# Patient Record
(demographics unavailable — no encounter records)

---

## 2024-11-26 NOTE — PHYSICAL EXAM
[No Acute Distress] : no acute distress [Well Nourished] : well nourished [Normal Oropharynx] : the oropharynx was normal [No Lymphadenopathy] : no lymphadenopathy [Clear to Auscultation] : lungs were clear to auscultation bilaterally [Normal S1, S2] : normal S1 and S2 [No Edema] : there was no peripheral edema [Non Tender] : non-tender [de-identified] : mostly nonverbal, sitting quietly in WC; clean and dressed nicely [de-identified] : severe dementia, nonverbal

## 2024-11-26 NOTE — ASSESSMENT
[FreeTextEntry1] : Pt as outlined, progressing dementia but stable on her regimen. Vaccines uptodate except Tdap, will get at pharm per Merry. Moving to [facility] home soon, will be diff transition.  contin current regimen, meds renewed.

## 2024-11-26 NOTE — HISTORY OF PRESENT ILLNESS
[FreeTextEntry1] : f/u [de-identified] :  73 yo woman w  progressive dementia, pulm embolism (incid finding, asx, unprovoked), anxiety/depr, incontinence, UTIs. Here w aide Merry. h/o  fall, hip fx s/p repair then rehab at [facility] in '23; now wheelchair bound. Note: pt is not on OPorosis medication. Famiy did not want to pursue due to comorbidities and dental issues. Meds reconciled, on Cryptohep for appetite, does cause some constip but relieved w senna leaf; no diarrhea. Oxybutinin 5 mg, they feel sl helpful but worry about side effects if we go up on dose, Neuro had wanted any meds avoided if possible. Lexapro, Xarelto, Nemantine. Mood and sleeping are ok per aide. Appetie is better on Cryptoheptdine. Saw Neuro, no changes. Speaking less these days. Flu shot: had Being moved to [facility] home in 1/25, Merry will try to stay during transition.

## 2024-12-10 NOTE — REVIEW OF SYSTEMS
[Confused or Disoriented] : confusion [Memory Lapses or Loss] : memory loss [Decr. Concentrating Ability] : decreased concentrating ability [Difficulty with Language] : ~M difficulty with language [Changed Thought Patterns] : no change in thought patterns [Repeating Questions] : repeated questioning about recent events [Facial Weakness] : no facial weakness [Arm Weakness] : no arm weakness [Hand Weakness] :  hand weakness [Leg Weakness] : leg weakness [Poor Coordination] : poor coordination [Difficulty Writing] : difficulty writing [Difficulties in Speech] : speech difficulties [Numbness] : no numbness [Tingling] : no tingling [Abnormal Sensation] : no abnormal sensation [Hypersensitivity] : no hypersensitivity [Seizures] : no convulsions [Dizziness] : no dizziness [Fainting] : no fainting [Lightheadedness] : no lightheadedness [Vertigo] : no vertigo [Cluster Headache] : no cluster headache [Migraine Headache] : no migraine headache [Tension Headache] : no tension-type headache [Difficulty Walking] : difficulty walking [Inability to Walk] : able to walk [Ataxia] : no ataxia [Frequent Falls] : not falling [Limping] : not limping [As Noted in HPI] : as noted in HPI [Negative] : Heme/Lymph [de-identified] : anxiety and mild depression

## 2024-12-10 NOTE — ASSESSMENT
[FreeTextEntry1] : Assessment:  75yo RH WW with progressive cognitive decline and parkinsonism. Dependent on all ADLs/IADLs.  Sometimes forgets to chew her food.  Minimal speech output, only follows simple commands.  Diffuse paratonia and parkinsonian type features.  Needs bilat support to stand and walk, minimally.  Diagnostic Impression: Advanced dementia -LOAD -parkinsonism  Plan: -try and reduced/DC cyproheptadine, may be a bit sedating -no other changes -trying to get home care paid by [facility] - will keep me posted if I can help.

## 2024-12-10 NOTE — PHYSICAL EXAM
[FreeTextEntry1] : [id number]: Awake, partially aware. Follows simple commands, but limited speech. Diffuse rigidity, no tremors. Can stand with double support and take a few steps but needs to sustained.  [General Appearance - Alert] : alert [General Appearance - In No Acute Distress] : in no acute distress [Affect] : the affect was normal [Person] : oriented to person [Place] : disoriented to place [Time] : disoriented to time [Short Term Intact] : short term memory impaired [Remote Intact] : remote memory impaired [Registration Intact] : recent registration memory impaired [Span Intact] : the attention span was decreased [Concentration Intact] : normal concentrating ability [Visual Intact] : visual attention was ~T ~L decreased [Naming Objects] : difficulty naming common objects [Repeating Phrases] : difficulty repeating a phrase [Writing A Sentence] : difficulty writing a sentence [Fluency] : fluency not intact [Comprehension] : comprehension not intact [Reading] : difficulty reading [Current Events] : inadequate knowledge of current events [Past History] : inadequate knowledge of personal past history [Vocabulary] : inadequate range of vocabulary [Total Score ___ / 30] : the patient achieved a score of [unfilled] /30 [Date / Time ___ / 5] : date / time [unfilled] / 5 [Place ___ / 5] : place [unfilled] / 5 [Registration ___ / 3] : registration [unfilled] / 3 [Serial Sevens ___/5] : serial sevens [unfilled] / 5 [Naming 2 Objects ___ / 2] : naming two objects [unfilled] / 2 [Repeating a Sentence ___ / 1] : repeating a sentence [unfilled] / 1 [Writing a Sentence ___ / 1] : write sentence [unfilled] / 1 [3-stage Verbal Command ___ / 3] : three-stage verbal command [unfilled] / 3 [Written Command ___ / 1] : written command [unfilled] / 1 [Copy a Design ___ / 1] : copy a design [unfilled] / 1 [Recall ___ / 3] : recall [unfilled] / 3 [Cranial Nerves Optic (II)] : visual acuity intact bilaterally,  visual fields full to confrontation, pupils equal round and reactive to light [Cranial Nerves Trigeminal (V)] : facial sensation intact symmetrically [Cranial Nerves Facial (VII)] : face symmetrical [Cranial Nerves Vestibulocochlear (VIII)] : hearing was intact bilaterally [Cranial Nerves Glossopharyngeal (IX)] : tongue and palate midline [Cranial Nerves Accessory (XI - Cranial And Spinal)] : head turning and shoulder shrug symmetric [Cranial Nerves Hypoglossal (XII)] : there was no tongue deviation with protrusion [Motor Handedness Right-Handed] : the patient is right hand dominant [Paresis Pronator Drift Right-Sided] : no pronator drift on the right [Paresis Pronator Drift Left-Sided] : no pronator drift on the left [Motor Strength Upper Extremities Bilaterally] : there was weakness in both upper extremities [Motor Strength Upper Extremities Right] : there was weakness of the right upper extremity [Motor Strength Upper Extremities Left] : there was weakness of the left upper extremity [Motor Strength Lower Extremities Bilaterally] : there was weakness in both lower extremities [Motor Strength Lower Extremities Right] : there was weakness of the right lower extremity [Motor Strength Lower Extremities Left] : there was weakness of the left lower extremity [Limited Balance] : the patient's balance was impaired [Past-pointing] : past-pointing was present [Tremor] : no tremor present [3+] : Ankle jerk left 3+ [Plantar Reflex Right Only] : normal on the right [Plantar Reflex Left Only] : normal on the left [Glabellar Reflex] : the glabellar reflex was present [FreeTextEntry4] : exam limited -speaks softly and quietly  [FreeTextEntry5] : eyes will track but will not follow on demand  [FreeTextEntry6] : diffuse paratonia in UE R > L and LE [FreeTextEntry8] : unable to examine her walking or standing. w/c bound  [FreeTextEntry9] : previous exam: Small step shuffling gait, needs full assistance with walking; can march in place and pick her feet up minimally [Sclera] : the sclera and conjunctiva were normal [PERRL With Normal Accommodation] : pupils were equal in size, round, reactive to light, with normal accommodation [Extraocular Movements] : extraocular movements were intact [No APD] : no afferent pupillary defect [No ANJEL] : no internuclear ophthalmoplegia [Outer Ear] : the ears and nose were normal in appearance [Oropharynx] : the oropharynx was normal [Neck Appearance] : the appearance of the neck was normal [Neck Cervical Mass (___cm)] : no neck mass was observed [Jugular Venous Distention Increased] : there was no jugular-venous distention [Thyroid Diffuse Enlargement] : the thyroid was not enlarged [Thyroid Nodule] : there were no palpable thyroid nodules [] : no respiratory distress [Auscultation Breath Sounds / Voice Sounds] : lungs were clear to auscultation bilaterally [Heart Sounds Gallop] : no gallops [Murmurs] : no murmurs [Heart Sounds Pericardial Friction Rub] : no pericardial rub [Full Pulse] : the pedal pulses are present [Edema] : there was no peripheral edema

## 2024-12-10 NOTE — REASON FOR VISIT
[Follow-Up: _____] : a [unfilled] follow-up visit [Formal Caregiver] : formal caregiver [FreeTextEntry1] : memory

## 2024-12-10 NOTE — HISTORY OF PRESENT ILLNESS
[FreeTextEntry1] : Covid 3, mild. COVID VACCINE FULL.   2024: -On Cyproheptadine 4mg daily - for appetite (?), runny nose? unclear -appetite reduced - no real loss of weight -sleeps ok -     HPI interval 2024: 73-year-old female presents today for follow up with her sister-in-law. Has an aid . She lives with her son and brother. She is w/c bound and depends on it fully. Can  shower with assistance. Remains dependent on all adls and iadls. Her memory has decline more. No hallucinations or delusions. She is not undergoing treatment for Colon cancer. She is more confused and disoriented.  appetite: good. she gained some weight. she eats 3 meals  sleep: good mood: ok speech: poor. She speaks loud and poor comprehension. Difficulty making a sentence.   Interval Hx: 2023 Since last seen. Cognitive has been relatively stable. No issues with mood or appetite. Hallucinations are minimal. Speech is a bit worse. Will not finish a sentence at times, will pause and not make sense. Sometimes she will forget to chew her food. No coughing or signs of aspiration. She is no longer getting PT. Can stand minimally. Continues to be mostly wheelchair bound. Has a aid .  Remains dependent on all ADLs and IADLs.   Interval Hx: 2023  Since last seen there has been a cognitive decline.  She is here with her aid Ange. She is more confused and continues to be dependent with all ADLS. She is now also having hallucinations of people who are not present. She will have episodes of agitation trying to leave the house but can be redirected.  Sleep and appetite no issues. She is receiving PT 3x/week. Wheelchair bound mostly. She is happy most of the time.  She does have a recent diagnosis of colon cancer.    Interval  Hx : 2023:: Since last visit, she is not functioning well.  When taking Sinemet she had adverse effects which was then d/c.  She now needs dependence on all her ADLs, has a caregiver . She uses a wheelchair mostly now because of motor changes  She is sleeping at night but wakes up infrequently. No agitation. Her appetite varies but no changes in weight. She enjoys going out and socializing. She is aware when she needs to use the bathroom but is incontinent at night.   HPI-Interval Hx 2022: Pt here with her HHA. Recent dental issues, bleeding from gums, unclear what was done-she reports an MRI, but the 3/8 MRI was done for me. Per HHA, she uses toothpicks too much, causing bleeds.  Appetite and sleep ok per pt, but HHA says she is quite agitated at night, restless, no napping in the day. Weight is stable. Night time issues are also driven by anxiety, e.g. when she has an appt the next day. Motor: one fall getting off the bed, related by pt, no one saw it. She appears a bit slower. reports more difficulty getting up from chair. Pt more sedentary. MRI reviewed, AD pattern. Labs with possible allergic pattern, no ABs +. Pt confirms having severe seasonal and dust allergy.  HPI: 72yo RH WW with Anxiety, here with possible dementia.   PMH: Pt seen by Dom aSmuels in the past. Since 1-2y, she has started to have STM issues. She was seen by Dr. Samuels, and started with Namenda. Per 2021 note, pt was having memory issues, seen by Paras Gilbert, with NPT c/w dementia, but atypical.  LOC in , NOS.   Since taking Oxybutynin, and maybe Namenda, she has less appetite and possibly her gait is slower.   -Memory: recent events, appointments, some names and faces -Speech: ok -Orientation: ok for now; stopped driving -Praxis: some issues with phone, buttons etc. -Decision making/Executive fx/Multitasking: limited  -Sleep: ok, seldom naps  -Appetite: stable, eats more vegetables now  -Motor symptoms: slower gait, more unstable  -B/B: urgency, polyuria  -Psychiatric symptoms: anxiety, possible VH (woman in the house).  -Functional status: Bo Index of [place] in Activities of Daily Livin. Bathing/Showerin-needs some help 2. Dressin 3. Toiletin 4. Transferrin-needs help in the shower, needed to have rails put in 5. Continence: 1 6: Feedin  TOTAL: 6  [place] Instrumental Activities of Daily Living: A. Ability to Use Telephone: 1 B. Shoppin C. Food Preparation: 0 D. Housekeepin E. Laundry: 0 F. Transportation: 0 G. Responsibility for Own Medications: 1 H: Ability to Handle Finances: 0  TOTAL: 3  CDR: 1.0  -Professional status: RN  PCP and other physicians: -PCP: BRYSON ESPARZA -Neuro: Arvind EASTMAN MD [place], [place]  [phone]  Workup done: MRI 2019 seen on [facility] viviana: TP atrophy, maybe occipital; on NQ analysis, O involved, with asymmetry L<R.

## 2024-12-10 NOTE — DATA REVIEWED
[de-identified] : I reviewed the MRI brain from 2019 on [facility] website. \par  New MRI brain 2022: confirm atrophy in AD pattern.

## 2025-01-14 NOTE — ASSESSMENT
[FreeTextEntry1] : Pt w advanced dementia. Appetite decreased after stopping the Cyproheptadine which had been helping. P; resume the medication, let me know if too lethargic. f/u 2 mo

## 2025-01-14 NOTE — HISTORY OF PRESENT ILLNESS
[FreeTextEntry8] : Merry brought pt in as she hasn't been eating past mo. Only cookies and chocolate. Saw Neuro 12/24 and Cyproheptadine was d/cd, per aide, this was helping her appetite very much. She seems otherwise stable per aide.  Was not sent to [facility] [place] home so far, still in own home.

## 2025-01-14 NOTE — PHYSICAL EXAM
[No Acute Distress] : no acute distress [Normal Oropharynx] : the oropharynx was normal [No Lymphadenopathy] : no lymphadenopathy [Clear to Auscultation] : lungs were clear to auscultation bilaterally [Normal S1, S2] : normal S1 and S2 [No Edema] : there was no peripheral edema [de-identified] : advanced dementia, nonverbal [de-identified] : mute

## 2025-02-05 NOTE — DATA REVIEWED
[de-identified] : I reviewed the MRI brain from 2019 on [facility] website. \par  New MRI brain 2022: confirm atrophy in AD pattern.

## 2025-02-05 NOTE — HISTORY OF PRESENT ILLNESS
[FreeTextEntry1] : Covid 3, mild. COVID VACCINE FULL.   HPI interval 2025: 74-year-old female presents today with her hha. HHA is 24/7. No hallucinations or delusions. No recent falls or hospitalizations. She socializes a lot with her friends. She walks in house a little. Memory has declined. No changes in adls or iadls. She is fully dependent in adls and iadls. No changes in medications.  mood: normal appetite: improved- weight is stable  sleep: good speech: declined in comprehnesion   2024: -On Cyproheptadine 4mg daily - for appetite (?), runny nose? unclear -appetite reduced - no real loss of weight -sleeps ok  HPI interval 2024: 73-year-old female presents today for follow up with her sister-in-law. Has an aid . She lives with her son and brother. She is w/c bound and depends on it fully. Can  shower with assistance. Remains dependent on all adls and iadls. Her memory has decline more. No hallucinations or delusions. She is not undergoing treatment for Colon cancer. She is more confused and disoriented.  appetite: good. she gained some weight. she eats 3 meals  sleep: good mood: ok speech: poor. She speaks loud and poor comprehension. Difficulty making a sentence.   Interval Hx: 2023 Since last seen. Cognitive has been relatively stable. No issues with mood or appetite. Hallucinations are minimal. Speech is a bit worse. Will not finish a sentence at times, will pause and not make sense. Sometimes she will forget to chew her food. No coughing or signs of aspiration. She is no longer getting PT. Can stand minimally. Continues to be mostly wheelchair bound. Has a aid .  Remains dependent on all ADLs and IADLs.   Interval Hx: 2023  Since last seen there has been a cognitive decline.  She is here with her aid Ange. She is more confused and continues to be dependent with all ADLS. She is now also having hallucinations of people who are not present. She will have episodes of agitation trying to leave the house but can be redirected.  Sleep and appetite no issues. She is receiving PT 3x/week. Wheelchair bound mostly. She is happy most of the time.  She does have a recent diagnosis of colon cancer.    Interval  Hx : 2023:: Since last visit, she is not functioning well.  When taking Sinemet she had adverse effects which was then d/c.  She now needs dependence on all her ADLs, has a caregiver . She uses a wheelchair mostly now because of motor changes  She is sleeping at night but wakes up infrequently. No agitation. Her appetite varies but no changes in weight. She enjoys going out and socializing. She is aware when she needs to use the bathroom but is incontinent at night.   HPI-Interval Hx 2022: Pt here with her HHA. Recent dental issues, bleeding from gums, unclear what was done-she reports an MRI, but the 3/8 MRI was done for me. Per HHA, she uses toothpicks too much, causing bleeds.  Appetite and sleep ok per pt, but HHA says she is quite agitated at night, restless, no napping in the day. Weight is stable. Night time issues are also driven by anxiety, e.g. when she has an appt the next day. Motor: one fall getting off the bed, related by pt, no one saw it. She appears a bit slower. reports more difficulty getting up from chair. Pt more sedentary. MRI reviewed, AD pattern. Labs with possible allergic pattern, no ABs +. Pt confirms having severe seasonal and dust allergy.  HPI: 70yo RH WW with Anxiety, here with possible dementia.   PMH: Pt seen by Dom Samuels in the past. Since 1-2y, she has started to have STM issues. She was seen by Dr. Samuels, and started with Namenda. Per 2021 note, pt was having memory issues, seen by Paras Gilbert, with NPT c/w dementia, but atypical.  LOC in , NOS.   Since taking Oxybutynin, and maybe Namenda, she has less appetite and possibly her gait is slower.   -Memory: recent events, appointments, some names and faces -Speech: ok -Orientation: ok for now; stopped driving -Praxis: some issues with phone, buttons etc. -Decision making/Executive fx/Multitasking: limited  -Sleep: ok, seldom naps  -Appetite: stable, eats more vegetables now  -Motor symptoms: slower gait, more unstable  -B/B: urgency, polyuria  -Psychiatric symptoms: anxiety, possible VH (woman in the house).  -Functional status: Bo Index of [place] in Activities of Daily Livin. Bathing/Showerin-needs some help 2. Dressin 3. Toiletin 4. Transferrin-needs help in the shower, needed to have rails put in 5. Continence: 1 6: Feedin  TOTAL: 6  [place] Instrumental Activities of Daily Living: A. Ability to Use Telephone: 1 B. Shoppin C. Food Preparation: 0 D. Housekeepin E. Laundry: 0 F. Transportation: 0 G. Responsibility for Own Medications: 1 H: Ability to Handle Finances: 0  TOTAL: 3  CDR: 1.0  -Professional status: RN  PCP and other physicians: -PCP: BRYSON ESPARZA -Neuro: Arvind EASTMAN MD [place], [place]  [phone]  Workup done: MRI 2019 seen on [facility] viviana: TP atrophy, maybe occipital; on NQ analysis, O involved, with asymmetry L<R.

## 2025-02-05 NOTE — REVIEW OF SYSTEMS
[Confused or Disoriented] : confusion [Memory Lapses or Loss] : memory loss [Decr. Concentrating Ability] : decreased concentrating ability [Difficulty with Language] : ~M difficulty with language [Repeating Questions] : repeated questioning about recent events [Hand Weakness] :  hand weakness [Leg Weakness] : leg weakness [Poor Coordination] : poor coordination [Difficulty Writing] : difficulty writing [Difficulties in Speech] : speech difficulties [Difficulty Walking] : difficulty walking [As Noted in HPI] : as noted in HPI [Negative] : Heme/Lymph [Changed Thought Patterns] : no change in thought patterns [Facial Weakness] : no facial weakness [Arm Weakness] : no arm weakness [Numbness] : no numbness [Tingling] : no tingling [Abnormal Sensation] : no abnormal sensation [Hypersensitivity] : no hypersensitivity [Seizures] : no convulsions [Dizziness] : no dizziness [Fainting] : no fainting [Lightheadedness] : no lightheadedness [Vertigo] : no vertigo [Cluster Headache] : no cluster headache [Migraine Headache] : no migraine headache [Tension Headache] : no tension-type headache [Inability to Walk] : able to walk [Ataxia] : no ataxia [Frequent Falls] : not falling [Limping] : not limping [de-identified] : anxiety and mild depression

## 2025-02-05 NOTE — REASON FOR VISIT
[Follow-Up: _____] : a [unfilled] follow-up visit [Formal Caregiver] : formal caregiver [FreeTextEntry1] : cognitive decline

## 2025-02-05 NOTE — PHYSICAL EXAM
[General Appearance - Alert] : alert [General Appearance - In No Acute Distress] : in no acute distress [Affect] : the affect was normal [Person] : oriented to person [Concentration Intact] : normal concentrating ability [Total Score ___ / 30] : the patient achieved a score of [unfilled] /30 [Date / Time ___ / 5] : date / time [unfilled] / 5 [Place ___ / 5] : place [unfilled] / 5 [Registration ___ / 3] : registration [unfilled] / 3 [Serial Sevens ___/5] : serial sevens [unfilled] / 5 [Naming 2 Objects ___ / 2] : naming two objects [unfilled] / 2 [Repeating a Sentence ___ / 1] : repeating a sentence [unfilled] / 1 [Writing a Sentence ___ / 1] : write sentence [unfilled] / 1 [3-stage Verbal Command ___ / 3] : three-stage verbal command [unfilled] / 3 [Written Command ___ / 1] : written command [unfilled] / 1 [Copy a Design ___ / 1] : copy a design [unfilled] / 1 [Recall ___ / 3] : recall [unfilled] / 3 [Cranial Nerves Optic (II)] : visual acuity intact bilaterally,  visual fields full to confrontation, pupils equal round and reactive to light [Cranial Nerves Trigeminal (V)] : facial sensation intact symmetrically [Cranial Nerves Facial (VII)] : face symmetrical [Cranial Nerves Vestibulocochlear (VIII)] : hearing was intact bilaterally [Cranial Nerves Glossopharyngeal (IX)] : tongue and palate midline [Cranial Nerves Accessory (XI - Cranial And Spinal)] : head turning and shoulder shrug symmetric [Cranial Nerves Hypoglossal (XII)] : there was no tongue deviation with protrusion [Motor Handedness Right-Handed] : the patient is right hand dominant [Motor Strength Upper Extremities Bilaterally] : there was weakness in both upper extremities [Motor Strength Upper Extremities Right] : there was weakness of the right upper extremity [Motor Strength Upper Extremities Left] : there was weakness of the left upper extremity [Motor Strength Lower Extremities Bilaterally] : there was weakness in both lower extremities [Motor Strength Lower Extremities Right] : there was weakness of the right lower extremity [Motor Strength Lower Extremities Left] : there was weakness of the left lower extremity [Limited Balance] : the patient's balance was impaired [Past-pointing] : past-pointing was present [3+] : Ankle jerk left 3+ [Glabellar Reflex] : the glabellar reflex was present [Sclera] : the sclera and conjunctiva were normal [PERRL With Normal Accommodation] : pupils were equal in size, round, reactive to light, with normal accommodation [Extraocular Movements] : extraocular movements were intact [No APD] : no afferent pupillary defect [No ANJEL] : no internuclear ophthalmoplegia [Outer Ear] : the ears and nose were normal in appearance [Oropharynx] : the oropharynx was normal [Neck Appearance] : the appearance of the neck was normal [Neck Cervical Mass (___cm)] : no neck mass was observed [Jugular Venous Distention Increased] : there was no jugular-venous distention [Thyroid Diffuse Enlargement] : the thyroid was not enlarged [Thyroid Nodule] : there were no palpable thyroid nodules [] : no respiratory distress [Auscultation Breath Sounds / Voice Sounds] : lungs were clear to auscultation bilaterally [Heart Sounds Gallop] : no gallops [Murmurs] : no murmurs [Heart Sounds Pericardial Friction Rub] : no pericardial rub [Full Pulse] : the pedal pulses are present [Edema] : there was no peripheral edema [FreeTextEntry1] : [id number]: Awake, partially aware. Follows simple commands, but limited speech. Diffuse rigidity, no tremors. Can stand with double support and take a few steps but needs to sustained.  [Place] : disoriented to place [Time] : disoriented to time [Short Term Intact] : short term memory impaired [Remote Intact] : remote memory impaired [Registration Intact] : recent registration memory impaired [Span Intact] : the attention span was decreased [Visual Intact] : visual attention was ~T ~L decreased [Naming Objects] : difficulty naming common objects [Repeating Phrases] : difficulty repeating a phrase [Writing A Sentence] : difficulty writing a sentence [Fluency] : fluency not intact [Comprehension] : comprehension not intact [Reading] : difficulty reading [Current Events] : inadequate knowledge of current events [Past History] : inadequate knowledge of personal past history [Vocabulary] : inadequate range of vocabulary [Paresis Pronator Drift Right-Sided] : no pronator drift on the right [Paresis Pronator Drift Left-Sided] : no pronator drift on the left [Tremor] : no tremor present [Plantar Reflex Right Only] : normal on the right [Plantar Reflex Left Only] : normal on the left [FreeTextEntry4] : exam limited -speaks softly and quietly  [FreeTextEntry5] : eyes will track but will not follow on demand  [FreeTextEntry6] : cogwheel rigidity  [FreeTextEntry8] : unable to examine her walking or standing. w/c bound  [FreeTextEntry9] : previous exam: Small step shuffling gait, needs full assistance with walking; can march in place and pick her feet up minimally

## 2025-02-05 NOTE — ASSESSMENT
[FreeTextEntry1] : Assessment:  75yo RH WW with progressive cognitive decline and parkinsonism. Dependent on all ADLs/IADLs.  Sometimes forgets to chew her food.  Minimal speech output only follows simple commands.  Diffuse paratonia and parkinsonian type features.  Needs bilat support to stand and walk, minimally.  Diagnostic Impression: Advanced dementia -LOAD -parkinsonism  Plan: -no other changes -PT for unsteady gait, physical deconditioning  -Appetite has improved, stable weight

## 2025-04-17 NOTE — PHYSICAL EXAM
[No Acute Distress] : no acute distress [Well Nourished] : well nourished [Well Developed] : well developed [Well-Appearing] : well-appearing [Normal Sclera/Conjunctiva] : normal sclera/conjunctiva [PERRL] : pupils equal round and reactive to light [EOMI] : extraocular movements intact [Normal Outer Ear/Nose] : the outer ears and nose were normal in appearance [Normal Oropharynx] : the oropharynx was normal [No JVD] : no jugular venous distention [No Lymphadenopathy] : no lymphadenopathy [Supple] : supple [Thyroid Normal, No Nodules] : the thyroid was normal and there were no nodules present [No Respiratory Distress] : no respiratory distress  [No Accessory Muscle Use] : no accessory muscle use [Clear to Auscultation] : lungs were clear to auscultation bilaterally [Normal Rate] : normal rate  [Regular Rhythm] : with a regular rhythm [Normal S1, S2] : normal S1 and S2 [No Murmur] : no murmur heard [No Carotid Bruits] : no carotid bruits [No Abdominal Bruit] : a ~M bruit was not heard ~T in the abdomen [No Varicosities] : no varicosities [Pedal Pulses Present] : the pedal pulses are present [No Edema] : there was no peripheral edema [No Palpable Aorta] : no palpable aorta [No Extremity Clubbing/Cyanosis] : no extremity clubbing/cyanosis [Soft] : abdomen soft [Non Tender] : non-tender [Non-distended] : non-distended [No Masses] : no abdominal mass palpated [No HSM] : no HSM [Normal Bowel Sounds] : normal bowel sounds [Normal Posterior Cervical Nodes] : no posterior cervical lymphadenopathy [Normal Anterior Cervical Nodes] : no anterior cervical lymphadenopathy [No CVA Tenderness] : no CVA  tenderness [No Spinal Tenderness] : no spinal tenderness [No Joint Swelling] : no joint swelling [Grossly Normal Strength/Tone] : grossly normal strength/tone [No Rash] : no rash [Coordination Grossly Intact] : coordination grossly intact [No Focal Deficits] : no focal deficits [Deep Tendon Reflexes (DTR)] : deep tendon reflexes were 2+ and symmetric [de-identified] : examined in WC;quiet w occas unintelligible mumbling, very soft; confused. [de-identified] : nonamb [de-identified] : aphasic, confused, sitting silently in WC

## 2025-04-17 NOTE — ASSESSMENT
[Vaccines Reviewed] : Immunizations reviewed today. Please see immunization details in the vaccine log within the immunization flowsheet.  [FreeTextEntry1] : Pt w advancing multifactorial dementia. Family has opted out of HCMs eg mammo, [place]. Will check routine labs  Wt checked w assistance unclear if accurate or if prior wt s were accurate- in any case she has forgotten how to eat mostly and question of Gtube may come up. She had made wishes clear of no gtube but need to d/w family in near future in case she stops eating completely.  Tdap at pharm  Meds renewed.

## 2025-04-17 NOTE — HEALTH RISK ASSESSMENT
[Poor] : ~his/her~ mood as  poor [No] : No [No falls in past year] : Patient reported no falls in the past year [0] : 2) Feeling down, depressed, or hopeless: Not at all (0) [Never] : Never [Patient declined mammogram] : Patient declined mammogram [Patient declined PAP Smear] : Patient declined PAP Smear [Patient declined bone density test] : Patient declined bone density test [Patient declined colonoscopy] : Patient declined colonoscopy [HIV test declined] : HIV test declined [Hepatitis C test declined] : Hepatitis C test declined [Change in mental status noted] : Change in mental status noted [Language] : difficulty with language [Behavior] : difficulty with behavior [Handling Complex Tasks] : difficulty handling complex tasks [Reasoning] : difficulty with reasoning [de-identified] : no- WC bound [de-identified] : hardly eats [[id number]] : 0 [de-identified] : worsening dementia, aphasia, Parkinsons, LBD

## 2025-04-17 NOTE — HISTORY OF PRESENT ILLNESS
[de-identified] : 73 yo woman w lewy body dementia, Parkinsons' ds, Alzheimers h/o  pulm embolism (incid finding, asx, unprovoked), anxiety/depr, incontinence, UTIs. Here w aidjerome Sousa. She is wheelchair-bd, cannot stand for more than a few secs w full assistance. Dementia is advancing, unable to speak, no choking but hardly eating despite good options.  No UTIs recently. Diaper incont urine. BMs nl. once a week large volume. Flu shot: declines other vax : had old shingles vax many yrs ago, declines new shingrix; Tdap due will get at pharm.  Per prior discussions w Brody (brother, guardian) not doing HCMs such as Wayne, Mammo  Pt lives w son Mitchell; has 2 other sons  nearby and 4 grandkids. Ritesh and Domingo handle her insurance (changing to medicaid) and bills. They have arranged full time help thru medicaid which is apparently coming soon.  Pt doesn't eat much- per Merry, she forgot how to eat. She does go out to lunch once a mo w her lifelong friends and will eat at a restaurant in social setting. Merry cooks for her, she also drinks Ensure. She has told her family before becoming ill that she would never want artificial feeding eg G tube, will need to d/w kids and Brody.

## 2025-07-17 NOTE — PHYSICAL EXAM
[General Appearance - Alert] : alert [General Appearance - In No Acute Distress] : in no acute distress [Affect] : the affect was normal [Person] : oriented to person [Concentration Intact] : normal concentrating ability [Total Score ___ / 30] : the patient achieved a score of [unfilled] /30 [Date / Time ___ / 5] : date / time [unfilled] / 5 [Place ___ / 5] : place [unfilled] / 5 [Registration ___ / 3] : registration [unfilled] / 3 [Serial Sevens ___/5] : serial sevens [unfilled] / 5 [Naming 2 Objects ___ / 2] : naming two objects [unfilled] / 2 [Repeating a Sentence ___ / 1] : repeating a sentence [unfilled] / 1 [Writing a Sentence ___ / 1] : write sentence [unfilled] / 1 [3-stage Verbal Command ___ / 3] : three-stage verbal command [unfilled] / 3 [Written Command ___ / 1] : written command [unfilled] / 1 [Copy a Design ___ / 1] : copy a design [unfilled] / 1 [Recall ___ / 3] : recall [unfilled] / 3 [Cranial Nerves Optic (II)] : visual acuity intact bilaterally,  visual fields full to confrontation, pupils equal round and reactive to light [Cranial Nerves Trigeminal (V)] : facial sensation intact symmetrically [Cranial Nerves Facial (VII)] : face symmetrical [Cranial Nerves Vestibulocochlear (VIII)] : hearing was intact bilaterally [Cranial Nerves Glossopharyngeal (IX)] : tongue and palate midline [Cranial Nerves Accessory (XI - Cranial And Spinal)] : head turning and shoulder shrug symmetric [Cranial Nerves Hypoglossal (XII)] : there was no tongue deviation with protrusion [Motor Handedness Right-Handed] : the patient is right hand dominant [Motor Strength Upper Extremities Bilaterally] : there was weakness in both upper extremities [Motor Strength Upper Extremities Right] : there was weakness of the right upper extremity [Motor Strength Upper Extremities Left] : there was weakness of the left upper extremity [Motor Strength Lower Extremities Bilaterally] : there was weakness in both lower extremities [Motor Strength Lower Extremities Right] : there was weakness of the right lower extremity [Motor Strength Lower Extremities Left] : there was weakness of the left lower extremity [Limited Balance] : the patient's balance was impaired [Past-pointing] : past-pointing was present [3+] : Ankle jerk left 3+ [Glabellar Reflex] : the glabellar reflex was present [Sclera] : the sclera and conjunctiva were normal [PERRL With Normal Accommodation] : pupils were equal in size, round, reactive to light, with normal accommodation [Extraocular Movements] : extraocular movements were intact [No APD] : no afferent pupillary defect [No ANJEL] : no internuclear ophthalmoplegia [Outer Ear] : the ears and nose were normal in appearance [Oropharynx] : the oropharynx was normal [Neck Appearance] : the appearance of the neck was normal [Neck Cervical Mass (___cm)] : no neck mass was observed [Jugular Venous Distention Increased] : there was no jugular-venous distention [Thyroid Diffuse Enlargement] : the thyroid was not enlarged [Thyroid Nodule] : there were no palpable thyroid nodules [] : no respiratory distress [Auscultation Breath Sounds / Voice Sounds] : lungs were clear to auscultation bilaterally [Heart Sounds Gallop] : no gallops [Murmurs] : no murmurs [Heart Sounds Pericardial Friction Rub] : no pericardial rub [Full Pulse] : the pedal pulses are present [Edema] : there was no peripheral edema [FreeTextEntry1] : [id number]: Awake, partially aware. Follows simple commands, but limited speech. Diffuse rigidity, no tremors. Can stand with double support and take a few steps but needs to sustained.  [Place] : disoriented to place [Time] : disoriented to time [Short Term Intact] : short term memory impaired [Remote Intact] : remote memory impaired [Registration Intact] : recent registration memory impaired [Span Intact] : the attention span was decreased [Visual Intact] : visual attention was ~T ~L decreased [Naming Objects] : difficulty naming common objects [Repeating Phrases] : difficulty repeating a phrase [Writing A Sentence] : difficulty writing a sentence [Fluency] : fluency not intact [Comprehension] : comprehension not intact [Reading] : difficulty reading [Current Events] : inadequate knowledge of current events [Past History] : inadequate knowledge of personal past history [Vocabulary] : inadequate range of vocabulary [Paresis Pronator Drift Right-Sided] : no pronator drift on the right [Paresis Pronator Drift Left-Sided] : no pronator drift on the left [Tremor] : no tremor present [Plantar Reflex Right Only] : normal on the right [Plantar Reflex Left Only] : normal on the left [FreeTextEntry4] : exam limited -speaks softly and quietly  [FreeTextEntry5] : eyes will track but will not follow on demand  [FreeTextEntry6] : cogwheel rigidity  [FreeTextEntry8] : unable to examine her walking or standing. w/c bound  [FreeTextEntry9] : previous exam: Small step shuffling gait, needs full assistance with walking; can march in place and pick her feet up minimally

## 2025-07-17 NOTE — REVIEW OF SYSTEMS
[Confused or Disoriented] : confusion [Memory Lapses or Loss] : memory loss [Decr. Concentrating Ability] : decreased concentrating ability [Difficulty with Language] : ~M difficulty with language [Repeating Questions] : repeated questioning about recent events [Hand Weakness] :  hand weakness [Leg Weakness] : leg weakness [Poor Coordination] : poor coordination [Difficulty Writing] : difficulty writing [Difficulties in Speech] : speech difficulties [Difficulty Walking] : difficulty walking [As Noted in HPI] : as noted in HPI [Negative] : Heme/Lymph [Changed Thought Patterns] : no change in thought patterns [Facial Weakness] : no facial weakness [Arm Weakness] : no arm weakness [Numbness] : no numbness [Tingling] : no tingling [Abnormal Sensation] : no abnormal sensation [Hypersensitivity] : no hypersensitivity [Seizures] : no convulsions [Dizziness] : no dizziness [Fainting] : no fainting [Lightheadedness] : no lightheadedness [Vertigo] : no vertigo [Cluster Headache] : no cluster headache [Migraine Headache] : no migraine headache [Tension Headache] : no tension-type headache [Inability to Walk] : able to walk [Ataxia] : no ataxia [Frequent Falls] : not falling [Limping] : not limping [de-identified] : anxiety and mild depression

## 2025-07-17 NOTE — REASON FOR VISIT
Home [Follow-Up: _____] : a [unfilled] follow-up visit [Formal Caregiver] : formal caregiver [FreeTextEntry1] : cognitive decline

## 2025-07-17 NOTE — ASSESSMENT
[FreeTextEntry1] : Assessment:  75yo RH WW with progressive cognitive decline and parkinsonism. Dependent on all ADLs/IADLs.  Sometimes forgets to chew her food.  Minimal speech output only follows simple commands.  Diffuse paratonia and parkinsonian type features.  Needs bilat support to stand and walk, minimally. Suffering from constipation   Diagnostic Impression: Advanced dementia -LOAD -parkinsonism  Plan: -Appetite is stable, stable weight  -Educated on importance of healthy lifestyle modifications such as daily exercise, healthy Med. diet and good sleep habits -Discussed healthy lifestyle modifications to manage constipation and referred to PCP

## 2025-07-17 NOTE — DATA REVIEWED
[de-identified] : I reviewed the MRI brain from 2019 on [facility] website. \par  New MRI brain 2022: confirm atrophy in AD pattern.